# Patient Record
Sex: MALE | Race: WHITE | NOT HISPANIC OR LATINO | URBAN - METROPOLITAN AREA
[De-identification: names, ages, dates, MRNs, and addresses within clinical notes are randomized per-mention and may not be internally consistent; named-entity substitution may affect disease eponyms.]

---
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
## 2023-10-19 NOTE — ED PROCEDURE NOTE - CPROC ED TOLERANCE1
HPI:  57-year-old male past medical history of multiple myeloma diagnosed in 2017 not on any chemotherapy or radiation coming in with complaints of Left flank pain. The pain started 2 days prior to admission, scale 10/10, on feels the pain while moving, walking, or changing positions, when he stays still there is no pain at all. Patient also has nausea and loss of apetitie for the past few days. He reports that on last week he had several episodes of diarrhea which resolved after taking imodium.    In the ED vitals were stable.  wbc showed mild leukocytosis  troponin were 0.06  EKG shows new III and aVF t wave inversions  CT scan with oral contrast shows no obstruction. It shows multiple lytic lesions of the spine and pelvis, consistent with known   history of multiple myeloma.      He was found to have MAC on CKD and elevated troponins and proBNP. *(baseline 1.9-2.1)  - likely prerenal from diarrhea and now improved  - hypercalcemia now improved  - no hydronephrosis on CT scan      PAST MEDICAL & SURGICAL HISTORY:  Multiple myeloma, remission status unspecified      No significant past surgical history          Hospital Course:    TODAY'S SUBJECTIVE & REVIEW OF SYMPTOMS:     Constitutional WNL   Cardio WNL   Resp WNL   GI flank pain   Heme WNL  Endo WNL  Skin WNL  MSK WNL  Neuro WNL  Cognitive WNL  Psych WNL      MEDICATIONS  (STANDING):  acetaminophen     Tablet .. 975 milliGRAM(s) Oral every 6 hours  heparin   Injectable 5000 Unit(s) SubCutaneous every 12 hours  methocarbamol 500 milliGRAM(s) Oral three times a day    MEDICATIONS  (PRN):  oxyCODONE    IR 5 milliGRAM(s) Oral every 8 hours PRN Moderate Pain (4 - 6)      FAMILY HISTORY:  No pertinent family history in first degree relatives        Allergies    No Known Allergies    Intolerances        SOCIAL HISTORY:    [  ] Etoh  [  ] Smoking  [  ] Substance abuse     Home Environment:  [   ] Home Alone  [ x  ] Lives with Family  [   ] Home Health Aid    Dwelling:  [   ] Apartment  [ x  ] Private House  [   ] Adult Home  [   ] Skilled Nursing Facility      [   ] Short Term  [   ] Long Term  [ x  ] Stairs       Elevator [   ]    FUNCTIONAL STATUS PTA: (Check all that apply)  Ambulation: [   x ]Independent    [   ] Dependent     [   ] Non-Ambulatory  Assistive Device: [   ] SA Cane  [   ]  Q Cane  [   ] Walker  [   ]  Wheelchair  ADL : [ x  ] Independent  [    ]  Dependent       Vital Signs Last 24 Hrs  T(C): 36.7 (19 Oct 2023 14:05), Max: 36.8 (18 Oct 2023 20:39)  T(F): 98.1 (19 Oct 2023 14:05), Max: 98.2 (18 Oct 2023 20:39)  HR: 79 (19 Oct 2023 14:05) (71 - 83)  BP: 165/77 (19 Oct 2023 14:05) (136/68 - 165/77)  BP(mean): --  RR: 18 (19 Oct 2023 14:05) (18 - 18)  SpO2: 97% (18 Oct 2023 20:39) (97% - 97%)    Parameters below as of 18 Oct 2023 20:39  Patient On (Oxygen Delivery Method): room air          PHYSICAL EXAM: Awake & Alert  GENERAL: NAD  HEAD:  Normocephalic  CHEST/LUNG: Clear   HEART: S1S2+  ABDOMEN: Soft, Nontender  EXTREMITIES:  no calf tenderness    NERVOUS SYSTEM:  Cranial Nerves 2-12 intact [   ] Abnormal  [   ]  ROM: WFL all extremities [ x  ]  Abnormal [   ]  Motor Strength: WFL all extremities  [ x  ]  Abnormal [   ]  Sensation: intact to light touch [x   ] Abnormal [   ]    FUNCTIONAL STATUS:  Bed Mobility: Independent [   ]  Supervision [  x ]  Needs Assistance [   ]  N/A [   ]  Transfers: Independent [   ]  Supervision [ x  ]  Needs Assistance [   ]  N/A [   ]   Ambulation: Independent [   ]  Supervision [ x  ]  Needs Assistance [   ]  N/A [   ]  ADL: Independent [   ] Requires Assistance [   ] N/A [   ]      LABS:                        9.2    8.06  )-----------( 237      ( 19 Oct 2023 07:10 )             27.2     10-19    138  |  103  |  26<H>  ----------------------------<  70  4.4   |  24  |  2.2<H>    Ca    9.8      19 Oct 2023 07:10  Phos  3.7     10-19  Mg     1.8     10-19    TPro  5.9<L>  /  Alb  3.2<L>  /  TBili  0.2  /  DBili  x   /  AST  16  /  ALT  11  /  AlkPhos  57  10-19      Urinalysis Basic - ( 19 Oct 2023 07:10 )    Color: x / Appearance: x / SG: x / pH: x  Gluc: 70 mg/dL / Ketone: x  / Bili: x / Urobili: x   Blood: x / Protein: x / Nitrite: x   Leuk Esterase: x / RBC: x / WBC x   Sq Epi: x / Non Sq Epi: x / Bacteria: x        RADIOLOGY & ADDITIONAL STUDIES:  
Patient tolerated procedure well.
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
## 2024-08-09 NOTE — ED ADULT TRIAGE NOTE - HEIGHT IN CM
182.88 [Diarrhea: Grade 0] : Diarrhea: Grade 0 [Negative] : Allergic/Immunologic [Cough] : no cough [SOB on Exertion] : no shortness of breath during exertion